# Patient Record
Sex: FEMALE | URBAN - METROPOLITAN AREA
[De-identification: names, ages, dates, MRNs, and addresses within clinical notes are randomized per-mention and may not be internally consistent; named-entity substitution may affect disease eponyms.]

---

## 2024-08-30 ENCOUNTER — HOSPITAL ENCOUNTER (OUTPATIENT)
Facility: HOSPITAL | Age: 34
Discharge: HOME | End: 2024-08-30
Attending: OBSTETRICS & GYNECOLOGY | Admitting: OBSTETRICS & GYNECOLOGY

## 2024-08-30 VITALS
HEART RATE: 90 BPM | DIASTOLIC BLOOD PRESSURE: 52 MMHG | TEMPERATURE: 98.2 F | SYSTOLIC BLOOD PRESSURE: 98 MMHG | RESPIRATION RATE: 18 BRPM | WEIGHT: 211 LBS | OXYGEN SATURATION: 100 % | BODY MASS INDEX: 33.12 KG/M2 | HEIGHT: 67 IN

## 2024-08-30 LAB
ABO + RH BLD: NORMAL
APPEARANCE UR: CLEAR
BACTERIA URNS QL MICRO: NEGATIVE /HPF
BASOPHILS # BLD: 0.1 K/UL (ref 0–0.1)
BASOPHILS NFR BLD: 1 % (ref 0–1)
BILIRUB UR QL: NEGATIVE
BLOOD GROUP ANTIBODIES SERPL: NORMAL
COLOR UR: ABNORMAL
DIFFERENTIAL METHOD BLD: ABNORMAL
EOSINOPHIL # BLD: 0 K/UL (ref 0–0.4)
EOSINOPHIL NFR BLD: 0 % (ref 0–7)
EPITH CASTS URNS QL MICRO: ABNORMAL /LPF
ERYTHROCYTE [DISTWIDTH] IN BLOOD BY AUTOMATED COUNT: 12.9 % (ref 11.5–14.5)
GLUCOSE UR STRIP.AUTO-MCNC: NEGATIVE MG/DL
HCT VFR BLD AUTO: 36.1 % (ref 35–47)
HGB BLD-MCNC: 11.9 G/DL (ref 11.5–16)
HGB UR QL STRIP: ABNORMAL
IMM GRANULOCYTES # BLD AUTO: 0.1 K/UL (ref 0–0.04)
IMM GRANULOCYTES NFR BLD AUTO: 1 % (ref 0–0.5)
KETONES UR QL STRIP.AUTO: ABNORMAL MG/DL
LEUKOCYTE ESTERASE UR QL STRIP.AUTO: ABNORMAL
LYMPHOCYTES # BLD: 1.5 K/UL (ref 0.8–3.5)
LYMPHOCYTES NFR BLD: 15 % (ref 12–49)
MCH RBC QN AUTO: 29.7 PG (ref 26–34)
MCHC RBC AUTO-ENTMCNC: 33 G/DL (ref 30–36.5)
MCV RBC AUTO: 90 FL (ref 80–99)
MONOCYTES # BLD: 0.5 K/UL (ref 0–1)
MONOCYTES NFR BLD: 5 % (ref 5–13)
NEUTS SEG # BLD: 7.6 K/UL (ref 1.8–8)
NEUTS SEG NFR BLD: 78 % (ref 32–75)
NITRITE UR QL STRIP.AUTO: NEGATIVE
NRBC # BLD: 0 K/UL (ref 0–0.01)
NRBC BLD-RTO: 0 PER 100 WBC
PH UR STRIP: 6 (ref 5–8)
PLATELET # BLD AUTO: 168 K/UL (ref 150–400)
PMV BLD AUTO: 12.1 FL (ref 8.9–12.9)
PROT UR STRIP-MCNC: NEGATIVE MG/DL
RBC # BLD AUTO: 4.01 M/UL (ref 3.8–5.2)
RBC #/AREA URNS HPF: ABNORMAL /HPF (ref 0–5)
SP GR UR REFRACTOMETRY: 1.01
SPECIMEN EXP DATE BLD: NORMAL
URINE CULTURE IF INDICATED: ABNORMAL
UROBILINOGEN UR QL STRIP.AUTO: 0.2 EU/DL (ref 0.2–1)
WBC # BLD AUTO: 9.8 K/UL (ref 3.6–11)
WBC URNS QL MICRO: ABNORMAL /HPF (ref 0–4)

## 2024-08-30 PROCEDURE — 81001 URINALYSIS AUTO W/SCOPE: CPT

## 2024-08-30 PROCEDURE — 99205 OFFICE O/P NEW HI 60 MIN: CPT

## 2024-08-30 PROCEDURE — 85025 COMPLETE CBC W/AUTO DIFF WBC: CPT

## 2024-08-30 PROCEDURE — 86850 RBC ANTIBODY SCREEN: CPT

## 2024-08-30 PROCEDURE — 2580000003 HC RX 258: Performed by: OBSTETRICS & GYNECOLOGY

## 2024-08-30 PROCEDURE — 36415 COLL VENOUS BLD VENIPUNCTURE: CPT

## 2024-08-30 PROCEDURE — 76815 OB US LIMITED FETUS(S): CPT

## 2024-08-30 PROCEDURE — 86900 BLOOD TYPING SEROLOGIC ABO: CPT

## 2024-08-30 PROCEDURE — 86901 BLOOD TYPING SEROLOGIC RH(D): CPT

## 2024-08-30 PROCEDURE — 96360 HYDRATION IV INFUSION INIT: CPT

## 2024-08-30 RX ORDER — SODIUM CHLORIDE, SODIUM LACTATE, POTASSIUM CHLORIDE, AND CALCIUM CHLORIDE .6; .31; .03; .02 G/100ML; G/100ML; G/100ML; G/100ML
1000 INJECTION, SOLUTION INTRAVENOUS ONCE
Status: COMPLETED | OUTPATIENT
Start: 2024-08-30 | End: 2024-08-30

## 2024-08-30 RX ADMIN — SODIUM CHLORIDE, POTASSIUM CHLORIDE, SODIUM LACTATE AND CALCIUM CHLORIDE 1000 ML: 600; 310; 30; 20 INJECTION, SOLUTION INTRAVENOUS at 11:43

## 2024-08-30 NOTE — PROGRESS NOTES
1035: pt arrived ambulatory from her parent's house at this time, with her wife. Pt reports vaginal bleeding of bright red blood in the bed, and after wiping that began at 0745 this morning. Pt also reports feeling \"a trickle of blood after waking, but no fluid mixed in, just the blood.\" Pt denies anything in the vagina in the past twenty-four hours at this time. Pt denies feeling any ctx/cramping/pain/discomfort of any type at this time. Pt denies any signs/symptoms of pre-e at this time. Pt denies any complications with this pregnancy/known anomalies with this baby at this time, but does report that this is an IVF pregnancy using donor sperm, and that she currently receives prenatal care in [place] where she and her wife live. Pt reports to RN that she and her wife are here visiting her parents for the weekend. Care of pt assumed at this time.    1056: spoke with Dr. Puente. MD made aware of pt's arrival, pt's complaints and MD requested at bedside to assess pt. No new orders received.    1104: Dr. Puente at bedside reviewing EFM tracings and assessing pt at this time.    1107: pt taken off of EFM by Dr. Puente for bedside ultrasound at this time.    1110: Dr. Puente remains at bedside performing bedside ultrasound at this time.    1115: Dr. Puente remains at bedside discussing plan of care with pt and pt's wife at this time. Pt and her wife verbalize understanding and agree to current plan of care to move forward with speculum exam at this time.    1122: speculum exam by Dr. Puente at this time.    1123: SVE by Dr. Puente; cervix closed/long/soft/posterior per MD at this time. VORB from Dr. Puente to start an IV, send a CBC, type and screen, UA and bolus pt with one liter of LR now. No additional orders received.    1220: Dr. Puente at bedside reviewing EFM tracings and discussing plan of care with pt and pt's wife at this time. Pt taken off of EFM by MD at this time. No new orders received.    1243: spoke with

## 2024-08-30 NOTE — PROGRESS NOTES
BPP:    Breathing: > 30 secs------------------- 2 Pts    Tone FM: Multiple----------------------- 2 Pts    GrossFM: Multiple----------------------- 2 Pts    RYAN: 14.0----------------------------------- 2 Pts    NST: Reactive---------------------------- 2 Pts    BPP 10/10    Vtx    Placenta anterior right lateral starting at fundus with no retroplacental lucencies, No previa    This test was done by and interpreted by me.    Tru Puente M.D.